# Patient Record
Sex: MALE | Employment: FULL TIME | URBAN - METROPOLITAN AREA
[De-identification: names, ages, dates, MRNs, and addresses within clinical notes are randomized per-mention and may not be internally consistent; named-entity substitution may affect disease eponyms.]

---

## 2021-12-26 ENCOUNTER — HOSPITAL ENCOUNTER (OUTPATIENT)
Age: 66
Setting detail: OBSERVATION
Discharge: HOME | End: 2021-12-28
Attending: STUDENT IN AN ORGANIZED HEALTH CARE EDUCATION/TRAINING PROGRAM | Admitting: HOSPITALIST
Payer: COMMERCIAL

## 2021-12-26 ENCOUNTER — APPOINTMENT (OUTPATIENT)
Dept: CT IMAGING | Age: 66
End: 2021-12-26
Attending: STUDENT IN AN ORGANIZED HEALTH CARE EDUCATION/TRAINING PROGRAM
Payer: COMMERCIAL

## 2021-12-26 DIAGNOSIS — E78.5 HYPERLIPIDEMIA, UNSPECIFIED HYPERLIPIDEMIA TYPE: ICD-10-CM

## 2021-12-26 DIAGNOSIS — E11.49 CONTROLLED TYPE 2 DIABETES MELLITUS WITH OTHER NEUROLOGIC COMPLICATION, WITHOUT LONG-TERM CURRENT USE OF INSULIN (HCC): ICD-10-CM

## 2021-12-26 DIAGNOSIS — G45.9 TIA (TRANSIENT ISCHEMIC ATTACK): Primary | ICD-10-CM

## 2021-12-26 LAB
ALBUMIN SERPL-MCNC: 4 G/DL (ref 3.5–5)
ALBUMIN/GLOB SERPL: 1.2 {RATIO} (ref 1.1–2.2)
ALP SERPL-CCNC: 62 U/L (ref 45–117)
ALT SERPL-CCNC: 42 U/L (ref 12–78)
ANION GAP SERPL CALC-SCNC: 12 MMOL/L (ref 5–15)
AST SERPL-CCNC: 21 U/L (ref 15–37)
BASOPHILS # BLD: 0 K/UL (ref 0–0.1)
BASOPHILS NFR BLD: 1 % (ref 0–1)
BILIRUB SERPL-MCNC: 0.3 MG/DL (ref 0.2–1)
BUN SERPL-MCNC: 26 MG/DL (ref 6–20)
BUN/CREAT SERPL: 29 (ref 12–20)
CALCIUM SERPL-MCNC: 9.1 MG/DL (ref 8.5–10.1)
CHLORIDE SERPL-SCNC: 102 MMOL/L (ref 97–108)
CO2 SERPL-SCNC: 28 MMOL/L (ref 21–32)
CREAT SERPL-MCNC: 0.9 MG/DL (ref 0.7–1.3)
DIFFERENTIAL METHOD BLD: NORMAL
EOSINOPHIL # BLD: 0 K/UL (ref 0–0.4)
EOSINOPHIL NFR BLD: 1 % (ref 0–7)
ERYTHROCYTE [DISTWIDTH] IN BLOOD BY AUTOMATED COUNT: 12.6 % (ref 11.5–14.5)
GLOBULIN SER CALC-MCNC: 3.3 G/DL (ref 2–4)
GLUCOSE BLD STRIP.AUTO-MCNC: 112 MG/DL (ref 65–117)
GLUCOSE SERPL-MCNC: 123 MG/DL (ref 65–100)
HCT VFR BLD AUTO: 43.1 % (ref 36.6–50.3)
HGB BLD-MCNC: 14.4 G/DL (ref 12.1–17)
IMM GRANULOCYTES # BLD AUTO: 0 K/UL (ref 0–0.04)
IMM GRANULOCYTES NFR BLD AUTO: 0 % (ref 0–0.5)
INR PPP: 1.1 (ref 0.9–1.1)
LYMPHOCYTES # BLD: 2.1 K/UL (ref 0.8–3.5)
LYMPHOCYTES NFR BLD: 32 % (ref 12–49)
MCH RBC QN AUTO: 30.6 PG (ref 26–34)
MCHC RBC AUTO-ENTMCNC: 33.4 G/DL (ref 30–36.5)
MCV RBC AUTO: 91.5 FL (ref 80–99)
MONOCYTES # BLD: 0.3 K/UL (ref 0–1)
MONOCYTES NFR BLD: 5 % (ref 5–13)
NEUTS SEG # BLD: 4.1 K/UL (ref 1.8–8)
NEUTS SEG NFR BLD: 61 % (ref 32–75)
NRBC # BLD: 0 K/UL (ref 0–0.01)
NRBC BLD-RTO: 0 PER 100 WBC
PLATELET # BLD AUTO: 250 K/UL (ref 150–400)
PMV BLD AUTO: 10 FL (ref 8.9–12.9)
POTASSIUM SERPL-SCNC: 3.8 MMOL/L (ref 3.5–5.1)
PROT SERPL-MCNC: 7.3 G/DL (ref 6.4–8.2)
PROTHROMBIN TIME: 11 SEC (ref 9–11.1)
RBC # BLD AUTO: 4.71 M/UL (ref 4.1–5.7)
SERVICE CMNT-IMP: NORMAL
SODIUM SERPL-SCNC: 142 MMOL/L (ref 136–145)
TSH SERPL DL<=0.05 MIU/L-ACNC: 1.32 UIU/ML (ref 0.36–3.74)
WBC # BLD AUTO: 6.7 K/UL (ref 4.1–11.1)

## 2021-12-26 PROCEDURE — 80061 LIPID PANEL: CPT

## 2021-12-26 PROCEDURE — 85610 PROTHROMBIN TIME: CPT

## 2021-12-26 PROCEDURE — 93005 ELECTROCARDIOGRAM TRACING: CPT

## 2021-12-26 PROCEDURE — 82962 GLUCOSE BLOOD TEST: CPT

## 2021-12-26 PROCEDURE — 74011250637 HC RX REV CODE- 250/637: Performed by: STUDENT IN AN ORGANIZED HEALTH CARE EDUCATION/TRAINING PROGRAM

## 2021-12-26 PROCEDURE — 74011000636 HC RX REV CODE- 636: Performed by: STUDENT IN AN ORGANIZED HEALTH CARE EDUCATION/TRAINING PROGRAM

## 2021-12-26 PROCEDURE — 70450 CT HEAD/BRAIN W/O DYE: CPT

## 2021-12-26 PROCEDURE — 85025 COMPLETE CBC W/AUTO DIFF WBC: CPT

## 2021-12-26 PROCEDURE — 84443 ASSAY THYROID STIM HORMONE: CPT

## 2021-12-26 PROCEDURE — G0378 HOSPITAL OBSERVATION PER HR: HCPCS

## 2021-12-26 PROCEDURE — 94762 N-INVAS EAR/PLS OXIMTRY CONT: CPT

## 2021-12-26 PROCEDURE — 99285 EMERGENCY DEPT VISIT HI MDM: CPT

## 2021-12-26 PROCEDURE — 70498 CT ANGIOGRAPHY NECK: CPT

## 2021-12-26 PROCEDURE — 36415 COLL VENOUS BLD VENIPUNCTURE: CPT

## 2021-12-26 PROCEDURE — 80053 COMPREHEN METABOLIC PANEL: CPT

## 2021-12-26 RX ORDER — METFORMIN HYDROCHLORIDE 500 MG/1
500 TABLET ORAL 2 TIMES DAILY WITH MEALS
COMMUNITY

## 2021-12-26 RX ORDER — GUAIFENESIN 100 MG/5ML
81 LIQUID (ML) ORAL
Status: COMPLETED | OUTPATIENT
Start: 2021-12-26 | End: 2021-12-26

## 2021-12-26 RX ORDER — CLOPIDOGREL BISULFATE 75 MG/1
300 TABLET ORAL
Status: COMPLETED | OUTPATIENT
Start: 2021-12-26 | End: 2021-12-26

## 2021-12-26 RX ADMIN — IOPAMIDOL 100 ML: 755 INJECTION, SOLUTION INTRAVENOUS at 17:00

## 2021-12-26 RX ADMIN — CLOPIDOGREL BISULFATE 300 MG: 75 TABLET ORAL at 19:28

## 2021-12-26 RX ADMIN — ASPIRIN 81 MG CHEWABLE TABLET 81 MG: 81 TABLET CHEWABLE at 19:28

## 2021-12-26 NOTE — ED PROVIDER NOTES
Patient is a 45-year-old male presented to the ED with acute onset perioral numbness, right facial numbness as well as right upper and lower extremity numbness. No focal deficits. No history of head trauma, no anticoagulation. Blood glucose 112. Tier 1 code stroke called    The history is provided by the patient and a relative. Numbness  This is a new problem. The current episode started less than 1 hour ago. The problem has not changed since onset. There was right facial, left upper extremity and right upper extremity focality noted. Pertinent negatives include no focal weakness, no loss of balance, no slurred speech, no speech difficulty, no memory loss, no movement disorder, no auditory change, no mental status change and no disorientation. There has been no fever. Pertinent negatives include no vomiting, no altered mental status, no confusion and no nausea. There were no medications administered prior to arrival. Associated medical issues do not include trauma, seizures, dementia or CVA. Past Medical History:   Diagnosis Date    Diabetes ([facility] [address]     diet controlled    Hyperlipidemia     Sciatica        No past surgical history on file. History reviewed. No pertinent family history.     Social History     Socioeconomic History    Marital status:      Spouse name: Not on file    Number of children: Not on file    Years of education: Not on file    Highest education level: Not on file   Occupational History    Not on file   Tobacco Use    Smoking status: Never Smoker    Smokeless tobacco: Never Used   Substance and Sexual Activity    Alcohol use: No    Drug use: No    Sexual activity: Not on file   Other Topics Concern    Not on file   Social History Narrative    Not on file     Social Determinants of Health     Financial Resource Strain:     Difficulty of Paying Living Expenses: Not on file   Food Insecurity:     Worried About Running Out of Food in the Last Year: Not on file  Ran Out of Food in the Last Year: Not on file   Transportation Needs:     Lack of Transportation (Medical): Not on file    Lack of Transportation (Non-Medical): Not on file   Physical Activity:     Days of Exercise per Week: Not on file    Minutes of Exercise per Session: Not on file   Stress:     Feeling of Stress : Not on file   Social Connections:     Frequency of Communication with Friends and Family: Not on file    Frequency of Social Gatherings with Friends and Family: Not on file    Attends Restorationism Services: Not on file    Active Member of [address] [place] [address] [employer] or Organizations: Not on file    Attends Club or Organization Meetings: Not on file    Marital Status: Not on file   Intimate Partner Violence:     Fear of Current or Ex-Partner: Not on file    Emotionally Abused: Not on file    Physically Abused: Not on file    Sexually Abused: Not on file   Housing Stability:     Unable to Pay for Housing in the Last Year: Not on file    Number of [place] in the Last Year: Not on file    Unstable Housing in the Last Year: Not on file         ALLERGIES: Patient has no known allergies. Review of Systems   Constitutional: Negative. HENT: Negative. Eyes: Negative. Respiratory: Negative. Cardiovascular: Negative. Gastrointestinal: Negative. Negative for nausea and vomiting. Endocrine: Negative. Genitourinary: Negative. Musculoskeletal: Negative. Skin: Negative. Allergic/Immunologic: Negative. Neurological: Positive for numbness. Negative for focal weakness, speech difficulty and loss of balance. Hematological: Negative. Psychiatric/Behavioral: Negative. Negative for confusion and memory loss. Vitals:    12/26/21 1943 12/26/21 1958 12/26/21 2013 12/26/21 2028   BP: (!) 168/90 (!) 167/86 (!) 169/89 (!) 160/87   Pulse: 68 (!) 59 71 69   Resp: 16 15 17 16   Temp:       SpO2: 98% 97% 97% 97%   Weight:                Physical Exam  Vitals and nursing note reviewed. Constitutional:       General: He is not in acute distress. Appearance: Normal appearance. HENT:      Head: Normocephalic and atraumatic. Right Ear: External ear normal.      Left Ear: External ear normal.      Nose: Nose normal.   Eyes:      Extraocular Movements: Extraocular movements intact. Conjunctiva/sclera: Conjunctivae normal.   Cardiovascular:      Rate and Rhythm: Normal rate. Pulses: Normal pulses. Radial pulses are 2+ on the right side and 2+ on the left side. Heart sounds: Normal heart sounds. Pulmonary:      Effort: Pulmonary effort is normal.      Breath sounds: Normal breath sounds. Chest:      Chest wall: No deformity or tenderness. Abdominal:      General: Abdomen is flat. There is no distension. Tenderness: There is no abdominal tenderness. Musculoskeletal:         General: No deformity or signs of injury. Normal range of motion. Cervical back: Normal range of motion and neck supple. No tenderness. Skin:     General: Skin is warm and dry. Capillary Refill: Capillary refill takes less than 2 seconds. Neurological:      General: No focal deficit present. Mental Status: He is alert and oriented to person, place, and time. Cranial Nerves: No cranial nerve deficit. Sensory: Sensory deficit present. Motor: No weakness. Coordination: Coordination normal.      Gait: Gait normal.   Psychiatric:         Attention and Perception: Attention normal.         Mood and Affect: Mood normal.         Behavior: Behavior normal.          [facility]  ED Course as of 12/26/21 3987   Sun Dec 26, 2021   1640 Patient presenting with right-sided facial numbness, right upper extremity and right lower extremity numbness. No focal weakness. Very difficult exam due to patient not speaking [place]. Son is helpful with interpretation. Acute onset, less than 60 minutes. No trauma. No known blood thinners.   Tier 1 code stroke called [AL]   7422 NIH of zero, TIA, symptoms resolved after CT  Admit for full stroke work up with MRI  Give 300mg of plavix , 81mg of ASA now    DAPT, After that 81mg, 75mg for 3 weeks. Then 81mg daily   [AL]   2030 EKG interpretation:   Rhythm: normal sinus rhythm; and regular . Rate (approx.): 77; Axis: normal; Intervals: normal ; ST/T wave: normal; EKG documented and interpreted by Pat Quesada. Travis Lobo MD, Emergency Medicine.     [AL]      ED Course User Index  [AL] Giana Garza MD     LABORATORY RESULTS:  Labs Reviewed   METABOLIC PANEL, COMPREHENSIVE - Abnormal; Notable for the following components:       Result Value    Glucose 123 (*)     BUN 26 (*)     BUN/Creatinine ratio 29 (*)     All other components within normal limits   CBC WITH AUTOMATED DIFF   PROTHROMBIN TIME + INR   TSH 3RD GENERATION   LIPID PANEL   SAMPLES BEING HELD   HEMOGLOBIN A1C WITH EAG   GLUCOSE, POC       IMAGING RESULTS:  CT CODE NEURO HEAD WO CONTRAST   Final Result   No acute abnormality. CTA CODE NEURO HEAD AND NECK W CONT             MEDICATIONS GIVEN:  Medications   iopamidoL (ISOVUE-370) 76 % injection 100 mL (100 mL IntraVENous Given 12/26/21 1700)   clopidogreL (PLAVIX) tablet 300 mg (300 mg Oral Given 12/26/21 1928)   aspirin chewable tablet 81 mg (81 mg Oral Given 12/26/21 1928)       Differential diagnosis: Numbness, TIA, stroke    ED physician interpretation of imaging: CT head without acute bleeds  ED physician interpretation of EKG: No STEMI. See my interpretation EKG and ED course above. ED physician interpretation of laboratory results: Patient's lab work for stroke work-up unremarkable. MDM: Patient is a 57-year-old male who had right lower extremity as well as right facial numbness concerning for TIA with tier 1 stroke: Teleneurology consulted symptoms resolved while in the CT scanner who may have had a TIA who requires hospital admission for TIA work-up for an MRI and stroke evaluation.     Patient vital signs are stable, patient afebrile. DISPOSITION: Admitted    Perfect Serve Consult for Admission  6:28 PM    ED Room Number: SER03/03  Patient Name and age:  Eugenia Manning 77 y.o.  male  Working Diagnosis:   1. TIA (transient ischemic attack)        COVID-19 Suspicion:  no  Sepsis present:  no  Reassessment needed: no  Code Status:  Full Code  Readmission: no  Isolation Requirements:  no  Recommended Level of Care:  med/surg  Department:  [facility] - [phone]    Other: Patient presented to the ED with right-sided numbness, tier 1 code stroke called, symptoms resolved consistent with TIA. Teleneuro recommends admit for MRI, echo and additional stroke work-up. Patient given Plavix and aspirin in the ED. Noe Poon.  Nghia Kapoor MD        Procedures

## 2021-12-26 NOTE — ED TRIAGE NOTES
Right sided numbness starting 1 hr PTA. Pt has good feeling bilaterally that is equal, and good strength. Stroke alert called. Pt taken to CT.

## 2021-12-27 ENCOUNTER — APPOINTMENT (OUTPATIENT)
Dept: GENERAL RADIOLOGY | Age: 66
End: 2021-12-27
Attending: STUDENT IN AN ORGANIZED HEALTH CARE EDUCATION/TRAINING PROGRAM
Payer: COMMERCIAL

## 2021-12-27 LAB
ATRIAL RATE: 77 BPM
CALCULATED P AXIS, ECG09: 56 DEGREES
CALCULATED R AXIS, ECG10: -24 DEGREES
CALCULATED T AXIS, ECG11: 44 DEGREES
CHOLEST SERPL-MCNC: 195 MG/DL
DIAGNOSIS, 93000: NORMAL
HDLC SERPL-MCNC: 58 MG/DL
HDLC SERPL: 3.4 {RATIO} (ref 0–5)
LDLC SERPL CALC-MCNC: 107.4 MG/DL (ref 0–100)
P-R INTERVAL, ECG05: 208 MS
Q-T INTERVAL, ECG07: 396 MS
QRS DURATION, ECG06: 100 MS
QTC CALCULATION (BEZET), ECG08: 448 MS
TRIGL SERPL-MCNC: 148 MG/DL (ref ?–150)
VENTRICULAR RATE, ECG03: 77 BPM
VLDLC SERPL CALC-MCNC: 29.6 MG/DL

## 2021-12-27 PROCEDURE — G0378 HOSPITAL OBSERVATION PER HR: HCPCS

## 2021-12-27 PROCEDURE — 71045 X-RAY EXAM CHEST 1 VIEW: CPT

## 2021-12-27 RX ORDER — ACETAMINOPHEN 650 MG/1
650 SUPPOSITORY RECTAL
Status: DISCONTINUED | OUTPATIENT
Start: 2021-12-27 | End: 2021-12-28 | Stop reason: HOSPADM

## 2021-12-27 RX ORDER — ACETAMINOPHEN 325 MG/1
650 TABLET ORAL
Status: DISCONTINUED | OUTPATIENT
Start: 2021-12-27 | End: 2021-12-28 | Stop reason: HOSPADM

## 2021-12-27 RX ORDER — SODIUM CHLORIDE 0.9 % (FLUSH) 0.9 %
5-40 SYRINGE (ML) INJECTION AS NEEDED
Status: DISCONTINUED | OUTPATIENT
Start: 2021-12-27 | End: 2021-12-28 | Stop reason: HOSPADM

## 2021-12-27 RX ORDER — ONDANSETRON 4 MG/1
4 TABLET, ORALLY DISINTEGRATING ORAL
Status: DISCONTINUED | OUTPATIENT
Start: 2021-12-27 | End: 2021-12-28 | Stop reason: HOSPADM

## 2021-12-27 RX ORDER — SODIUM CHLORIDE 0.9 % (FLUSH) 0.9 %
5-40 SYRINGE (ML) INJECTION EVERY 8 HOURS
Status: DISCONTINUED | OUTPATIENT
Start: 2021-12-27 | End: 2021-12-28 | Stop reason: HOSPADM

## 2021-12-27 RX ORDER — POLYETHYLENE GLYCOL 3350 17 G/17G
17 POWDER, FOR SOLUTION ORAL DAILY PRN
Status: DISCONTINUED | OUTPATIENT
Start: 2021-12-27 | End: 2021-12-28 | Stop reason: HOSPADM

## 2021-12-27 RX ORDER — ONDANSETRON 2 MG/ML
4 INJECTION INTRAMUSCULAR; INTRAVENOUS
Status: DISCONTINUED | OUTPATIENT
Start: 2021-12-27 | End: 2021-12-28 | Stop reason: HOSPADM

## 2021-12-27 RX ADMIN — Medication 5 ML: at 23:00

## 2021-12-28 ENCOUNTER — APPOINTMENT (OUTPATIENT)
Dept: NON INVASIVE DIAGNOSTICS | Age: 66
End: 2021-12-28
Attending: HOSPITALIST
Payer: COMMERCIAL

## 2021-12-28 ENCOUNTER — APPOINTMENT (OUTPATIENT)
Dept: MRI IMAGING | Age: 66
End: 2021-12-28
Attending: STUDENT IN AN ORGANIZED HEALTH CARE EDUCATION/TRAINING PROGRAM
Payer: COMMERCIAL

## 2021-12-28 VITALS
HEIGHT: 71 IN | RESPIRATION RATE: 15 BRPM | HEART RATE: 59 BPM | DIASTOLIC BLOOD PRESSURE: 82 MMHG | BODY MASS INDEX: 20.03 KG/M2 | TEMPERATURE: 98.6 F | WEIGHT: 143.08 LBS | SYSTOLIC BLOOD PRESSURE: 124 MMHG | OXYGEN SATURATION: 97 %

## 2021-12-28 LAB
AMPHET UR QL SCN: NEGATIVE
ANION GAP SERPL CALC-SCNC: 6 MMOL/L (ref 5–15)
BARBITURATES UR QL SCN: NEGATIVE
BENZODIAZ UR QL: NEGATIVE
BUN SERPL-MCNC: 19 MG/DL (ref 6–20)
BUN/CREAT SERPL: 26 (ref 12–20)
CALCIUM SERPL-MCNC: 8.7 MG/DL (ref 8.5–10.1)
CANNABINOIDS UR QL SCN: NEGATIVE
CHLORIDE SERPL-SCNC: 108 MMOL/L (ref 97–108)
CHOLEST SERPL-MCNC: 196 MG/DL
CO2 SERPL-SCNC: 26 MMOL/L (ref 21–32)
COCAINE UR QL SCN: NEGATIVE
COMMENT, HOLDF: NORMAL
CREAT SERPL-MCNC: 0.73 MG/DL (ref 0.7–1.3)
DRUG SCRN COMMENT,DRGCM: NORMAL
ECHO AV AREA PEAK VELOCITY: 4.3 CM2
ECHO AV AREA PEAK VELOCITY: 4.3 CM2
ECHO AV PEAK GRADIENT: 3 MMHG
ECHO AV PEAK VELOCITY: 0.8 M/S
ECHO AV VELOCITY RATIO: 1.13
ECHO LV E' LATERAL VELOCITY: 10 CM/S
ECHO LV E' SEPTAL VELOCITY: 8 CM/S
ECHO LV INTERNAL DIMENSION DIASTOLIC MMODE: 6.4 CM (ref 4.2–5.9)
ECHO LV INTERNAL DIMENSION SYSTOLIC MMODE: 4.4 CM
ECHO LV IVSD MMODE: 0.8 CM (ref 0.6–1)
ECHO LV IVSS MMODE: 1.2 CM
ECHO LV POSTERIOR WALL DIASTOLIC MMODE: 0.8 CM (ref 0.6–1)
ECHO LV POSTERIOR WALL SYSTOLIC MMODE: 1.2 CM
ECHO LVOT AREA: 3.8 CM2
ECHO LVOT DIAM: 2.2 CM
ECHO LVOT PEAK GRADIENT: 4 MMHG
ECHO LVOT PEAK VELOCITY: 0.9 M/S
ECHO MV A VELOCITY: 0.55 M/S
ECHO MV E VELOCITY: 0.57 M/S
ECHO MV E/A RATIO: 1.04
ECHO MV E/E' LATERAL: 5.7
ECHO MV E/E' RATIO (AVERAGED): 6.41
ECHO MV E/E' SEPTAL: 7.13
ECHO RV FREE WALL PEAK S': 14 CM/S
ECHO RV TAPSE: 3.1 CM (ref 1.5–2)
EST. AVERAGE GLUCOSE BLD GHB EST-MCNC: 134 MG/DL
GLUCOSE BLD STRIP.AUTO-MCNC: 131 MG/DL (ref 65–117)
GLUCOSE SERPL-MCNC: 93 MG/DL (ref 65–100)
HBA1C MFR BLD: 6.3 % (ref 4–5.6)
HDLC SERPL-MCNC: 56 MG/DL
HDLC SERPL: 3.5 {RATIO} (ref 0–5)
LDLC SERPL CALC-MCNC: 117 MG/DL (ref 0–100)
MAGNESIUM SERPL-MCNC: 2.2 MG/DL (ref 1.6–2.4)
METHADONE UR QL: NEGATIVE
OPIATES UR QL: NEGATIVE
PCP UR QL: NEGATIVE
PHOSPHATE SERPL-MCNC: 3.4 MG/DL (ref 2.6–4.7)
POTASSIUM SERPL-SCNC: 3.5 MMOL/L (ref 3.5–5.1)
SAMPLES BEING HELD,HOLD: NORMAL
SERVICE CMNT-IMP: ABNORMAL
SODIUM SERPL-SCNC: 140 MMOL/L (ref 136–145)
TRIGL SERPL-MCNC: 115 MG/DL (ref ?–150)
UR CULT HOLD, URHOLD: NORMAL
VLDLC SERPL CALC-MCNC: 23 MG/DL

## 2021-12-28 PROCEDURE — 82962 GLUCOSE BLOOD TEST: CPT

## 2021-12-28 PROCEDURE — 74011250637 HC RX REV CODE- 250/637: Performed by: HOSPITALIST

## 2021-12-28 PROCEDURE — 80061 LIPID PANEL: CPT

## 2021-12-28 PROCEDURE — 97165 OT EVAL LOW COMPLEX 30 MIN: CPT

## 2021-12-28 PROCEDURE — 84100 ASSAY OF PHOSPHORUS: CPT

## 2021-12-28 PROCEDURE — 36415 COLL VENOUS BLD VENIPUNCTURE: CPT

## 2021-12-28 PROCEDURE — G0378 HOSPITAL OBSERVATION PER HR: HCPCS

## 2021-12-28 PROCEDURE — 80307 DRUG TEST PRSMV CHEM ANLYZR: CPT

## 2021-12-28 PROCEDURE — 70551 MRI BRAIN STEM W/O DYE: CPT

## 2021-12-28 PROCEDURE — 93306 TTE W/DOPPLER COMPLETE: CPT

## 2021-12-28 PROCEDURE — 83036 HEMOGLOBIN GLYCOSYLATED A1C: CPT

## 2021-12-28 PROCEDURE — 80048 BASIC METABOLIC PNL TOTAL CA: CPT

## 2021-12-28 PROCEDURE — 97161 PT EVAL LOW COMPLEX 20 MIN: CPT

## 2021-12-28 PROCEDURE — 99205 OFFICE O/P NEW HI 60 MIN: CPT | Performed by: PSYCHIATRY & NEUROLOGY

## 2021-12-28 PROCEDURE — 83735 ASSAY OF MAGNESIUM: CPT

## 2021-12-28 RX ORDER — ATORVASTATIN CALCIUM 20 MG/1
20 TABLET, FILM COATED ORAL DAILY
Status: DISCONTINUED | OUTPATIENT
Start: 2021-12-28 | End: 2021-12-28 | Stop reason: HOSPADM

## 2021-12-28 RX ORDER — DEXTROSE 50 % IN WATER (D50W) INTRAVENOUS SYRINGE
12.5-25 AS NEEDED
Status: DISCONTINUED | OUTPATIENT
Start: 2021-12-28 | End: 2021-12-28 | Stop reason: HOSPADM

## 2021-12-28 RX ORDER — LORAZEPAM 2 MG/ML
1 INJECTION INTRAMUSCULAR ONCE
Status: DISCONTINUED | OUTPATIENT
Start: 2021-12-28 | End: 2021-12-28 | Stop reason: HOSPADM

## 2021-12-28 RX ORDER — INSULIN LISPRO 100 [IU]/ML
INJECTION, SOLUTION INTRAVENOUS; SUBCUTANEOUS
Status: DISCONTINUED | OUTPATIENT
Start: 2021-12-28 | End: 2021-12-28 | Stop reason: HOSPADM

## 2021-12-28 RX ORDER — ASPIRIN 81 MG/1
81 TABLET ORAL DAILY
Status: DISCONTINUED | OUTPATIENT
Start: 2021-12-28 | End: 2021-12-28 | Stop reason: HOSPADM

## 2021-12-28 RX ORDER — MAGNESIUM SULFATE 100 %
4 CRYSTALS MISCELLANEOUS AS NEEDED
Status: DISCONTINUED | OUTPATIENT
Start: 2021-12-28 | End: 2021-12-28 | Stop reason: HOSPADM

## 2021-12-28 RX ORDER — ASPIRIN 81 MG/1
81 TABLET ORAL DAILY
Qty: 30 TABLET | Refills: 11 | Status: SHIPPED | OUTPATIENT
Start: 2021-12-29

## 2021-12-28 RX ORDER — ATORVASTATIN CALCIUM 20 MG/1
20 TABLET, FILM COATED ORAL DAILY
Qty: 30 TABLET | Refills: 1 | Status: SHIPPED | OUTPATIENT
Start: 2021-12-29

## 2021-12-28 RX ADMIN — Medication 5 ML: at 06:16

## 2021-12-28 RX ADMIN — ATORVASTATIN CALCIUM 20 MG: 20 TABLET, FILM COATED ORAL at 08:50

## 2021-12-28 RX ADMIN — Medication 10 ML: at 14:20

## 2021-12-28 RX ADMIN — ASPIRIN 81 MG: 81 TABLET, COATED ORAL at 08:50

## 2021-12-28 NOTE — PROGRESS NOTES
Hospital follow-up PCP transitional care appointment has been scheduled with Dr. Chase Cruz for Tuesday, 1/11/22 at 10:30 a.m. Pending patient discharge.   Benny Khan, Care Management Specialist.

## 2021-12-28 NOTE — PROGRESS NOTES
OCCUPATIONAL THERAPY EVALUATION/DISCHARGE  Patient: Melani Meza (68 y.o. male)  Date: 12/28/2021  Primary Diagnosis: TIA (transient ischemic attack) [G45.9]       Precautions:  (Bosnian speaking)    ASSESSMENT  Note patient admitted for CVA work-up due to R sided numbness per chart, which patient reports has resolved. He presents with no other neurological deficits and is at independent functional baseline. Visual functions, sensation, perception, and BUE motor functions intact. Performed sit-stand and ambulated within room with intact balance. Patient and son receptive to education on BEFAST education on signs/ symptoms of CVA. Patient does not require additional OT at this time. Recommend return home at d/c. Current Level of Function (ADLs/self-care): Independent     Functional Outcome Measure: The patient scored Total A-D  Total A-D (Motor Function): 66/66 on the Fugl-Mcdonald Assessment with each UE which is indicative of no impairment in upper extremity functional status. PLAN :  Recommendation for discharge: (in order for the patient to meet his/her long term goals)  No skilled occupational therapy/ follow up rehabilitation needs identified at this time. This discharge recommendation:  Has been made in collaboration with the attending provider and/or case management         SUBJECTIVE:   Patient stated I'm better now.  (per son's translation)    OBJECTIVE DATA SUMMARY:   HISTORY:   Past Medical History:   Diagnosis Date    Diabetes ([address]     diet controlled    Hyperlipidemia     Sciatica    No past surgical history on file.     Prior Level of Function/Environment/Context: independent, lives with wife  Expanded or extensive additional review of patient history:     Home Situation  Home Environment: Private residence  Living Alone: No  Support Systems: Child(nuria)  Patient Expects to be Discharged to[de-identified] House  Current DME Used/Available at Home: None    EXAMINATION OF PERFORMANCE DEFICITS:  Cognitive/Behavioral Status:  Neurologic State: Alert  Orientation Level: Oriented X4  Cognition: Follows commands  Perception: Appears intact          Skin: visible skin appears intact    Edema: none noted    Hearing: WDL    Vision/Perceptual:    Tracking: Able to track stimulus in all quadrants w/o difficulty              Visual Fields:  (intact)  Diplopia: No    Acuity: Within Defined Limits         Range of Motion:    AROM: Within functional limits  PROM: Within functional limits                      Strength:    Strength: Within functional limits                Coordination:  Coordination: Within functional limits  Fine Motor Skills-Upper: Left Intact; Right Intact    Gross Motor Skills-Upper: Left Intact; Right Intact    Tone & Sensation:    Tone: Normal  Sensation: Intact                      Balance:  Sitting: Intact  Standing: Intact    Functional Mobility and Transfers for ADLs:  Bed Mobility:       Transfers:  Sit to Stand: Independent  Stand to Sit: Independent    ADL Assessment:  Feeding: Independent    Oral Facial Hygiene/Grooming: Independent    Bathing: Independent    Upper Body Dressing: Independent    Lower Body Dressing: Independent    Toileting: Independent            Functional Measure:  Fugl-Mcdonald Assessment of Motor Recovery after Stroke:   Reflex Activity  Flexors/Biceps/Fingers: Can be elicited  Extensors/Triceps: Can be elicited  Reflex Subtotal: 4    Volitional Movement Within Synergies  Shoulder Retraction: Full  Shoulder Elevation: Full  Shoulder Abduction (90 degrees): Full  Shoulder External Rotation: Full  Elbow Flexion: Full  Forearm Supination: Full  Shoulder Adduction/Internal Rotation: Full  Elbow Extension: Full  Forearm Pronation: Full  Subtotal: 18    Volitional Movement Mixing Synergies  Hand to Lumbar Spine: Full  Shoulder Flexion (0-90 degrees): Full  Pronation-Supination: Full  Subtotal: 6    Volitional Movement With Little or No Synergy  Shoulder Abduction (0-90 degrees): Full  Shoulder Flexion ( degrees): Full  Pronation/Supination: Full  Subtotal : 6    Normal Reflex Activity  Biceps, Triceps, Finger Flexors: Full  Subtotal : 2    Upper Extremity Total   Upper Extremity Total: 36    Wrist  Stability at 15 Degree Dorsiflexion: Full  Repeated Dorsiflexion/ Volar Flexion: Full  Stability at 15 Degree Dorsiflexion: Full  Repeated Dorsiflexion/ Volar Flexion: Full  Circumduction: Full  Wrist Total: 10    Hand  Mass Flexion: Full  Mass Extension: Full  Grasp A: Full  Grasp B: Full  Grasp C: Full  Grasp D: Full  Grasp E: Full  Hand Total: 14    Coordination/Speed  Tremor: None  Dysmetria: None  Time: <1s  Coordination/Speed Total : 6    Total A-D  Total A-D (Motor Function): 66/66     This is a reliable/valid measure of arm function after a neurological event. It has established value to characterize functional status and for measuring spontaneous and therapy-induced recovery; tests proximal and distal motor functions. Fugl-Mcdonald Assessment  UE scores recorded between five and 30 days post neurologic event can be used to predict UE recovery at six months post neurologic event. Severe = 0-21 points   Moderately Severe = 22-33 points   Moderate = 34-47 points   Mild = 48-66 points  HERIBERTO Lindsey, CHRIS Perkins, & SHARMILA Mark (1992). Measurement of motor recovery after stroke: Outcome assessment and sample size requirements.  Stroke, 23, pp. 8784-6496.   ------------------------------------------------------------------------------------------------------------------------------------------------------------------  MCID:  Stroke:   Michael Sheth, 2001; n = 171; mean age 79 (6) years; assessed within 16 (12) days of stroke, Acute Stroke)  FMA Motor Scores from Admission to Discharge   10 point increase in FMA Upper Extremity = 1.5 change in discharge FIM   10 point increase in FMA Lower Extremity = 1.9 change in discharge FIM  MDC:   Stroke:   Aleyda Riojas et al, 2008, n = 14, mean age = 59.9 (14.6) years, assessed on average 14 (6.5) months post stroke, Chronic Stroke)   FMA = 5.2 points for the Upper Extremity portion of the assessment     Occupational Therapy Evaluation Charge Determination   History Examination Decision-Making   LOW Complexity : Brief history review  LOW Complexity : 1-3 performance deficits relating to physical, cognitive , or psychosocial skils that result in activity limitations and / or participation restrictions  LOW Complexity : No comorbidities that affect functional and no verbal or physical assistance needed to complete eval tasks       Based on the above components, the patient evaluation is determined to be of the following complexity level: LOW   Pain Rating:  Patient did not report pain    Activity Tolerance:   VSS, good    After treatment patient left in no apparent distress:    Supine on stretcher for transport off the floor    COMMUNICATION/EDUCATION:   The patients plan of care was discussed with: Physical therapist, Registered nurse and Case management. Patient and/or family was verbally educated on the BE FAST acronym for signs/symptoms of CVA and TIA. BE FAST was written on patient's communication board  for visual education and reinforcement. All questions answered with patient indicating good understanding.      Thank you for this referral.  Delroy Mendoza OT  Time Calculation: 9 mins

## 2021-12-28 NOTE — H&P
History & Physical    Primary Care Provider: None  Source of Information: Patient and chart review    History of Presenting Illness:   Alicja Varner is a 77 y.o. male with pmh of niddm ii, dyslipidemia who presented to ed with complaints of right facial, RUE and RLE numbness. Patient reports sudden onset of symptoms that prompted him to seek ER care. He denies any previous history of strokes. Denies any history of head injuries. At time of my consultation, he states his symptoms have resolved and he is at his baseline. He has had no headaches, vision changes, ongoing numbness, weakness or gait changes. He was evaluated in ER and neurology recommended admission for CVA work-up. The patient denies any fever, chills, chest or abdominal pain, nausea, vomiting, cough, congestion, recent illness, palpitations, or dysuria. Remarkable vitals on ER Presentations: unremarkbale  Labs Remarkable for: unremarkable  ER Images: ct head and cta hea and neck showed no acute process. Review of Systems:  A comprehensive review of systems was negative except for that written in the History of Present Illness. Past Medical History:   Diagnosis Date    Diabetes ([address]     diet controlled    Hyperlipidemia     Sciatica       No past surgical history on file. Prior to Admission medications    Medication Sig Start Date End Date Taking? Authorizing Provider   metFORMIN (GLUCOPHAGE) 500 mg tablet Take 500 mg by mouth two (2) times daily (with meals). Yes Other, MD Martha     No Known Allergies   History reviewed. No pertinent family history.      SOCIAL HISTORY:  Patient resides:  Independently x   Assisted Living    SNF    With family care       Smoking history:   None x   Former    Chronic      Alcohol history:   None x   Social    Chronic      Ambulates:   Independently x   w/cane    w/walker    w/wc    CODE STATUS:  DNR    Full x   Other      Objective:     Physical Exam:     Visit Vitals  /65   Pulse 61   Temp 98.2 °F (36.8 °C)   Resp 15   Ht 5' 11\" (1.803 m)   Wt 65.9 kg (145 lb 4.5 oz)   SpO2 93%   BMI 20.26 kg/m²      O2 Device: None (Room air)    General:  Alert, cooperative, no distress, appears stated age. Head:  Normocephalic, without obvious abnormality, atraumatic. Eyes:  Conjunctivae/corneas clear. PERRL, EOMs intact. Nose: Nares normal. Septum midline. Mucosa normal.        Neck: Supple, symmetrical, trachea midline, no carotid bruit and no JVD. Lungs:   Clear to auscultation bilaterally. Chest wall:  No tenderness or deformity. Heart:  Regular rate and rhythm, S1, S2 normal, no murmur, click, rub or gallop. Abdomen:   Soft, non-tender. Bowel sounds normal. No masses,  No organomegaly. Extremities: Extremities normal, atraumatic, no cyanosis or edema. Pulses: 2+ and symmetric all extremities. Skin: Skin color, texture, turgor normal. No rashes or lesions   Neurologic: CNII-XII intact. EKG:  normal EKG, normal sinus rhythm. Data Review:     Recent Days:  Recent Labs     12/26/21  1643   WBC 6.7   HGB 14.4   HCT 43.1        Recent Labs     12/26/21  1643      K 3.8      CO2 28   *   BUN 26*   CREA 0.90   CA 9.1   ALB 4.0   ALT 42   INR 1.1     No results for input(s): PH, PCO2, PO2, HCO3, FIO2 in the last 72 hours. 24 Hour Results:  No results found for this or any previous visit (from the past 24 hour(s)).       Imaging:     Assessment:     Matthew Lewis is a 77 y.o. male with pmh of niddm ii, dyslipidemia who is admitted for TIA/CVA r/o       Plan:       TIA/CVA r/o  -Presenting with right facial and right sided numbness  -CT Head CTA H&N unremarkable  -Tele neuro recommended adt for CVA  -Symptoms since resolved  -TSH unremarkable  -MRI Brain in AM  -Check mag, phos, etoh and uds  -PT/OT  -Additional w/u per neuro    NIDDM II  -Check a1c  -SSI + hypoglycemic protocols    Dyslipidemia  -Not on a statin or asa  -initiate per neuro          FEN/GI -  PO hydration  Activity - as tolerated  DVT prophylaxis - scd  GI prophylaxis -  NI  Disposition - Home    CODE STATUS:  Full code       Signed By: Brendan Blanco MD     December 27, 2021

## 2021-12-28 NOTE — PROGRESS NOTES
Problem: Falls - Risk of  Goal: *Absence of Falls  Description: Document Gabriella Pearce Fall Risk and appropriate interventions in the flowsheet.   Outcome: Progressing Towards Goal  Note: Fall Risk Interventions:  Mobility Interventions: Communicate number of staff needed for ambulation/transfer,Patient to call before getting OOB                             Problem: Patient Education: Go to Patient Education Activity  Goal: Patient/Family Education  Outcome: Progressing Towards Goal

## 2021-12-28 NOTE — PROGRESS NOTES
Per Son, Patient had labs done 7/8/21  Obtained from lab papers picture on phone    A1C 7.9  Cholesterol total 228  HDL 62

## 2021-12-28 NOTE — CONSULTS
Neuro consult completed, dictated note to follow. TIA. Presented with right F/A/L numbness and mild weakness that lasted 40 minutes. No recurrent sxs. Exam is non-focal and unremarkable. No HTN. DM is well controlled with HgbA1C 6.3. , goal< 70, continue Lipitor 20 mg as started. Continue ASA 81mg daily as started.  F/u with PCP, does not need neurology f/u (next appt is in June regardless.)

## 2021-12-28 NOTE — PROGRESS NOTES
Bedside and Verbal shift change report given to 1710 Alex Davis (oncoming nurse) by Aaron Gillespie (offgoing nurse). Report included the following information Kardex, MAR, Recent Results and Cardiac Rhythm sINUS YENNI-sinus rhythm.

## 2021-12-28 NOTE — PROGRESS NOTES
PHYSICAL THERAPY EVALUATION WITH DISCHARGE  Patient: Hal Tobias (68 y.o. male)  Date: 12/28/2021   Primary Diagnosis: TIA (transient ischemic attack) [G45.9]       Precautions:    (Bosnian speaking)      ASSESSMENT  Based on the objective data described below, the patient presents with reports of R sided numbness that has now resolved s/p admission on 12/26 for CVA/TIA work up. Pt received seated EOB with son at bedside to assist with translation. Transport present to take patient for MRI. Head CT: negative. Pt demonstrated 5/5 LE strength and intact sensation. Pt completed transfers independently without AD and gait training with supervision/independent level. Pt with no apparent gait abnormalities. No further acute PT needs recommended or follow up post discharge. .    Functional Outcome Measure: The patient scored Total: 53/56 on the Butler Balance Assessment which is indicative of low fall risk. Other factors to consider for discharge: none     Further skilled acute physical therapy is not indicated at this time. PLAN :  Recommendation for discharge: (in order for the patient to meet his/her long term goals)  No skilled physical therapy/ follow up rehabilitation needs identified at this time. This discharge recommendation:  Has been made in collaboration with the attending provider and/or case management    IF patient discharges home will need the following DME: none         SUBJECTIVE:   Patient stated Kenia Flood I don't speak English well.     OBJECTIVE DATA SUMMARY:   HISTORY:    Past Medical History:   Diagnosis Date    Diabetes ([address]     diet controlled    Hyperlipidemia     Sciatica    No past surgical history on file.     Prior level of function: independent, lives with spouse, 2 daughters and son live in the area to assist as needed  Personal factors and/or comorbidities impacting plan of care:     Home Situation  Home Environment: Private residence  Living Alone: No  Support Systems: Spouse/Significant Other,Child(nuria)  Patient Expects to be Discharged to[de-identified] House  Current DME Used/Available at Home: None    EXAMINATION/PRESENTATION/DECISION MAKING:   Critical Behavior:  Neurologic State: Alert  Orientation Level: Oriented X4  Cognition: Follows commands,Appropriate decision making,Appropriate for age attention/concentration     Hearing:     Skin:    Edema:   Range Of Motion:  AROM: Within functional limits           PROM: Within functional limits           Strength:    Strength:  Within functional limits                    Tone & Sensation:   Tone: Normal              Sensation: Intact               Coordination:  Coordination: Within functional limits  Vision:      Functional Mobility:  Bed Mobility:              Transfers:  Sit to Stand: Independent  Stand to Sit: Independent                       Balance:   Sitting: Intact  Standing: Intact  Ambulation/Gait Training:  Distance (ft): 15 Feet (ft)  Assistive Device: Gait belt  Ambulation - Level of Assistance: Independent;Supervision        Gait Abnormalities: Decreased step clearance        Base of Support: Narrowed         Functional Measure  Butler Balance Test:    Sitting to Standin  Standing Unsupported: 4  Sitting with Back Unsupported: 4  Standing to Sittin  Transfers: 4  Standing Unsupported with Eyes Closed: 4  Standing Unsupported with Feet Together: 4  Reach Forward with Outstretched Arm: 4   Object: 4  Turn to Look Over Shoulders: 4  Turn 360 Degrees: 4  Alternate Foot on Step/Stool: 3  Standing Unsupported One Foot in Front: 3  Stand on One Leg: 3  Total: 53/56         56=Maximum possible score;   0-20=High fall risk  21-40=Moderate fall risk   41-56=Low fall risk        Based on the above components, the patient evaluation is determined to be of the following complexity level: LOW     Pain Rating:  Pt denied pain    Activity Tolerance:   Good    After treatment patient left in no apparent distress:   supine on ciara    COMMUNICATION/EDUCATION:   The patients plan of care was discussed with: Occupational therapist and Registered nurse. Patient was educated regarding His deficit(s) of impaired sensation R side (resolved at time of eval) as this relates to His diagnosis of TIA/CVA. He demonstrated Good understanding. Patient and/or family was verbally educated on the BE FAST acronym for signs/symptoms of CVA and TIA. BE FAST was written on patient's communication board  for visual education and reinforcement. All questions answered with patient indicating good understanding. Fall prevention education was provided and the patient/caregiver indicated understanding., Patient/family have participated as able in goal setting and plan of care. and Patient/family agree to work toward stated goals and plan of care.     Thank you for this referral.  Michael August, PT, DPT   Time Calculation: 8 mins

## 2021-12-28 NOTE — DISCHARGE INSTRUCTIONS
Please bring this form with you to show your primary care provider at your follow-up appointment. Primary care provider:  Dr. Naomi Crocker MD    Discharging provider:  Libby Zamora MD    You have been admitted to the hospital with the following diagnoses:  · TIA (transient ischemic attack) [G45.9]    FOLLOW-UP CARE RECOMMENDATIONS:    APPOINTMENTS:  Follow-up Information     Follow up With Specialties Details Why Contact Info    Naomi Crocker MD Family Medicine  Discharge follow up  [address] [facility]  [phone]              FOLLOW-UP TESTS recommended:   - repeat A1c in 3 months  - discuss increasing Metformin with PCP     PENDING TEST RESULTS:  At the time of your discharge the following test results are still pending: none  Please make sure you review these results with your outpatient follow-up provider(s). SYMPTOMS to watch for: chest pain, shortness of breath, fever, chills, nausea, vomiting, diarrhea, change in mentation, falling, weakness, bleeding. DIET/what to eat:  Diabetic Diet    ACTIVITY:  Activity as tolerated    WOUND CARE: NONE    EQUIPMENT needed:  NONE      What to do if new or unexpected symptoms occur? If you experience any of the above symptoms (or should other concerns or questions arise after discharge) please call your primary care physician. Return to the emergency room if you cannot get hold of your doctor. · It is very important that you keep your follow-up appointment(s). · Please bring discharge papers, medication list (and/or medication bottles) to your follow-up appointments for review by your outpatient provider(s). · Please check the list of medications and be sure it includes every medication (even non-prescription medications) that your provider wants you to take. · It is important that you take the medication exactly as they are prescribed.    · Keep your medication in the bottles provided by the pharmacist and keep a list of the medication names, dosages, and times to be taken in your wallet. · Do not take other medications without consulting your doctor. · If you have any questions about your medications or other instructions, please talk to your nurse or care provider before you leave the hospital.    I understand that if any problems occur once I am at home I am to contact my physician. These instructions were explained to me and I had the opportunity to ask questions.

## 2021-12-28 NOTE — PROGRESS NOTES
TRANSITION OF CARE  RUR--N/A  Disposition--Return home to independent functioning. PT evaluation of 12/28/21: no skilled needs. Nai Rodriguez    Patient's primary family contact: son Maria Esther Donnelly    Note: patient does not speak [place]. Patient speaks [place]. Note: Per son, Patient has been approved for Medicare Part A (has chosen not be enrolled in Medicare B at this time). Approval was recent and start date was backdated to April 2021 with card soon to arrive via mail. CM gave patients son the Patient Guide to Observation and Outpatient Care which patient's son signed--original to patient's son and copy to chart. CM gave patient's son the Medicare Outpatient Observation Notice which patient's son signed-- original to patient's son and copy to chart. Care Management Interventions  PCP Verified by CM: Yes  Transition of Care Consult (CM Consult): Other (None)  Physical Therapy Consult: Yes  Occupational Therapy Consult: Yes  Speech Therapy Consult: No  Support Systems: Child(nuria)  Confirm Follow Up Transport: Family  The Plan for Transition of Care is Related to the Following Treatment Goals : Return to independent functioning. Discharge Location  Discharge Placement: Home    Reason for Admission:  R/O CVA, TIA                   RUR Score:       6%                Plan for utilizing home health:      None    PCP: First and Last name:  Ailyn Gallardo MD   Name of Practice: [facility]   Are you a current patient: Yes    Approximate date of last visit:    Can you participate in a virtual visit with your PCP:                     Current Advanced Directive/Advance Care Plan: Full Code    Healthcare Decision Maker:   Click here to complete [address] including selection of the Healthcare Decision Maker Relationship (ie \"Primary\")                           Transition of Care Plan:                    CM met with patient and his son Padmini Bond.  Patient lives with his wife with 4 steps in and 7 interior steps. Patient has 2 supportive sisters. Patient is ambulatory and expects return to independent functioning and employment. Patient's son confirmed PCP, health insurance, and prescription coverage.    Previous home health :None  Previous IPR/ SNF rehab : None  DME : None

## 2021-12-28 NOTE — ED NOTES
TRANSFER - OUT REPORT:    Verbal report given to Angle Moura RN on TeleFlip  being transferred to [facility]ST[facility] room 672 for routine progression of care       Report consisted of patients Situation, Background, Assessment and   Recommendations(SBAR). Information from the following report(s) SBAR, ED Summary, [facility] and Cardiac Rhythm NSR was reviewed with the receiving nurse. Lines:   Peripheral IV 12/26/21 Anterior;Left;Proximal Forearm (Active)        Opportunity for questions and clarification was provided.      Patient is [place] speaking; limited/no english      Patient transported with:  [employer]    Monitor

## 2021-12-28 NOTE — PROGRESS NOTES
[address]           12/28/2021      RE: Maritza Perkins        To Whom It May Concern,    This is to certify that Wilda Davis has been under the care of [facility] due to acute illness from 12/26/2021 until 12/28/2021. Please excuse him from work until 1/2/2022. Yadira Johnson may return to work on/after 1/3/2022. Please feel free to contact my office ([phone]) if you have any questions or concerns. Thank you for your assistance in this matter.     Sincerely,            Jayden Banegas M.D  Internal Medicine

## 2021-12-28 NOTE — PROGRESS NOTES
I have reviewed discharge instructions with the caregiver. The caregiver verbalized understanding. Bedside RN performed patient education and medication education. Discharge concerns initiated and discussed with patient, including clarification on \"who\" assists the patient at their home and instructions for when the home going patient should call their provider after discharge. Opportunity for questions and clarification was provided. Patient receptive to education: YES  Patient stated: understanding with translation from [employer]  Barriers to Education: speaks bosnian  Diagnosis Education given:  YES    Length of stay: 0  Expected Day of Discharge: 12/28/21  Ask if they have \"Help at Home\" & add to white board?   YES    Education Day #: 2    Medication Education Given:  YES  M in the box Medication name: lipitor    Pt aware of HCAHPS survey: YES          Stroke Education documented in Patient Education: YES  Core Measures Documented in Connect Care:  Risk Factors: YES  Warning signs of stroke: YES  When to Activate 911: YES  Medication Education for Risk Factors: YES  Smoking cessation if applicable: YES  Written Education Given:  YES    Discharge NIH Completed: YES  Score: 0    BRAINS: YES    Follow Up Appointment Made: YES  Date/Time if applicable: n/a

## 2021-12-28 NOTE — PROGRESS NOTES
Problem: TIA/CVA Stroke: 0-24 hours  Goal: Activity/Safety  Outcome: Progressing Towards Goal  Goal: Consults, if ordered  Outcome: Progressing Towards Goal  Goal: Nutrition/Diet  Outcome: Progressing Towards Goal  Goal: Medications  Outcome: Progressing Towards Goal

## 2021-12-29 NOTE — CONSULTS
[address]    Name:  Edgar Hahn  MR#:  [id number]  :  1955  ACCOUNT #:  [de-identified]  DATE OF SERVICE:  2021      NEUROLOGY CONSULTATION    HISTORY OF PRESENT ILLNESS:  This is a 51-year-old right-handed male, admitted 2021 with complaints of right face, arm and leg numbness and weakness that lasted for about 40 minutes. The patient is [facility] and is seen with his son at the bedside, who helps provide history and translation. The patient initially reported just numbness. On further questioning, states he noticed his right arm was a little weaker than the left. He denies prior history of TIA or stroke. He does have a diagnosis of diabetes for which he is on metformin, HgbA1c is 6.3. He has a history of hyperlipidemia, LDL is 117 and he is not on a statin at home. He was not on any antiplatelet therapy. He does not smoke. No known hypertension, blood pressure at presentation, however, was 168/90. Subsequent pressures have been in the 120s-135. The patient has been started on aspirin 81 mg a day and Lipitor 20 mg a day. CT of the head was unremarkable. CTA of the head and neck revealed atherosclerotic changes with moderate left and mild right vertebral origin stenosis, but no flow-limiting stenoses and no LVO. MRI of the brain is reviewed and is unremarkable with mild chronic ischemic changes seen only. His echo shows an EF of 60%-65%. No PFO. EKG with normal sinus rhythm. The patient has been stable since admission and is anxiously awaiting discharge. PAST MEDICAL HISTORY:  Diabetes, hyperlipidemia, sciatica. REVIEW OF SYSTEMS:  As per past medical history or HPI, otherwise reviewed and negative. MEDICATIONS AT HOME:  Metformin. Here, aspirin 81 mg a day and Lipitor 20 mg a day. ALLERGIES:  NO KNOWN DRUG ALLERGIES. SOCIAL HISTORY:  He is , lives in [facility] [place] [facility] with his wife. He is [place]. He is a shaffer.   No tobacco, alcohol or drug use. FAMILY HISTORY:  No strokes in the family. He has one son and two daughters who are healthy. PHYSICAL EXAMINATION:  VITAL SIGNS:  Blood pressure 126/69, pulse 73, respiratory rate 17, satting 96% on room air, temperature is 98.1, BMI of 19.9. GENERAL:  He is a well-nourished, well-developed, very healthy-appearing male, sitting in bed, in no distress. HEART:  Regular rate and rhythm without murmurs, gallops, or rubs. Carotids 2+, no bruits. EXTREMITIES:  Warm without clubbing, cyanosis or edema. NEUROLOGIC:  Mental Status:  Alert and oriented x4. Speech and language intact. Attention, memory, fund of knowledge appropriate. Cranial nerve exam, no facial asymmetry or ptosis. Extraocular eye movements intact without diplopia or nystagmus. Visual fields full. Pupils are round and reactive. Tongue midline. Palate elevated symmetrically. Strength, sensation, hearing intact. Trapezius and sternocleidomastoid are 5/5. Motor exam is 5/5 throughout. No pronator drift. He has a slight tremor in the left. Sensory exam is intact to light touch and pinprick throughout. Coordination:  Intact to finger-to-nose, rapid alternating movements. Heel-to-shin and gait not assessed at this time. STUDIES AND REPORTS:  Reviewed above. ASSESSMENT AND PLAN:  This is a 70-year right-handed male with well-controlled diabetes, hyperlipidemia with an LDL of 117, presenting with transient right face, arm and leg numbness and mild right upper extremity weakness, lasting a total of 40 minutes with mild atherosclerotic changes seen on CTA, but no flow-limiting stenosis or LVO. MRI of the brain negative for acute stroke. Echo is unremarkable. EKG with normal sinus rhythm. Exam is nonfocal.  This is consistent with a transient ischemic attack. Recommend starting Lipitor 20 mg a day at home with goal LDL of less than 70. Continue the aspirin 81 mg a day at home as started here.   The patient should follow up with his primary care physician for ongoing management of diabetes and hyperlipidemia. The patient does not need follow up in Neurology at this time.       Pa Walker MD      [id number]  D:  12/29/2021 10:33  T:  12/29/2021 11:02  JOB #:  [id number]

## 2022-03-19 PROBLEM — G45.9 TIA (TRANSIENT ISCHEMIC ATTACK): Status: ACTIVE | Noted: 2021-12-26

## 2023-04-28 ENCOUNTER — APPOINTMENT (OUTPATIENT)
Dept: GENERAL RADIOLOGY | Age: 68
End: 2023-04-28
Attending: FAMILY MEDICINE

## 2023-04-28 ENCOUNTER — HOSPITAL ENCOUNTER (EMERGENCY)
Age: 68
Discharge: HOME | End: 2023-04-28
Attending: EMERGENCY MEDICINE

## 2023-04-28 VITALS
DIASTOLIC BLOOD PRESSURE: 87 MMHG | BODY MASS INDEX: 22.44 KG/M2 | WEIGHT: 156.75 LBS | SYSTOLIC BLOOD PRESSURE: 169 MMHG | TEMPERATURE: 97.8 F | HEIGHT: 70 IN | OXYGEN SATURATION: 97 % | HEART RATE: 73 BPM | RESPIRATION RATE: 18 BRPM

## 2023-04-28 DIAGNOSIS — R07.9 CHEST PAIN, UNSPECIFIED TYPE: Primary | ICD-10-CM

## 2023-04-28 LAB
ALBUMIN SERPL-MCNC: 3.6 G/DL (ref 3.5–5)
ALBUMIN/GLOB SERPL: 0.9 (ref 1.1–2.2)
ALP SERPL-CCNC: 69 U/L (ref 45–117)
ALT SERPL-CCNC: 26 U/L (ref 12–78)
ANION GAP SERPL CALC-SCNC: 1 MMOL/L (ref 5–15)
AST SERPL-CCNC: 14 U/L (ref 15–37)
BASOPHILS # BLD: 0 K/UL (ref 0–0.1)
BASOPHILS NFR BLD: 1 % (ref 0–1)
BILIRUB SERPL-MCNC: 0.3 MG/DL (ref 0.2–1)
BUN SERPL-MCNC: 20 MG/DL (ref 6–20)
BUN/CREAT SERPL: 22 (ref 12–20)
CALCIUM SERPL-MCNC: 9.3 MG/DL (ref 8.5–10.1)
CHLORIDE SERPL-SCNC: 107 MMOL/L (ref 97–108)
CO2 SERPL-SCNC: 32 MMOL/L (ref 21–32)
COMMENT, HOLDF: NORMAL
CREAT SERPL-MCNC: 0.9 MG/DL (ref 0.7–1.3)
DIFFERENTIAL METHOD BLD: NORMAL
EOSINOPHIL # BLD: 0.1 K/UL (ref 0–0.4)
EOSINOPHIL NFR BLD: 2 % (ref 0–7)
ERYTHROCYTE [DISTWIDTH] IN BLOOD BY AUTOMATED COUNT: 12.4 % (ref 11.5–14.5)
GLOBULIN SER CALC-MCNC: 3.8 G/DL (ref 2–4)
GLUCOSE SERPL-MCNC: 124 MG/DL (ref 65–100)
HCT VFR BLD AUTO: 42.2 % (ref 36.6–50.3)
HGB BLD-MCNC: 13.7 G/DL (ref 12.1–17)
IMM GRANULOCYTES # BLD AUTO: 0 K/UL (ref 0–0.04)
IMM GRANULOCYTES NFR BLD AUTO: 0 % (ref 0–0.5)
LYMPHOCYTES # BLD: 1.9 K/UL (ref 0.8–3.5)
LYMPHOCYTES NFR BLD: 30 % (ref 12–49)
MCH RBC QN AUTO: 30.2 PG (ref 26–34)
MCHC RBC AUTO-ENTMCNC: 32.5 G/DL (ref 30–36.5)
MCV RBC AUTO: 93.2 FL (ref 80–99)
MONOCYTES # BLD: 0.3 K/UL (ref 0–1)
MONOCYTES NFR BLD: 5 % (ref 5–13)
NEUTS SEG # BLD: 3.9 K/UL (ref 1.8–8)
NEUTS SEG NFR BLD: 62 % (ref 32–75)
NRBC # BLD: 0 K/UL (ref 0–0.01)
NRBC BLD-RTO: 0 PER 100 WBC
PLATELET # BLD AUTO: 243 K/UL (ref 150–400)
PMV BLD AUTO: 10.6 FL (ref 8.9–12.9)
POTASSIUM SERPL-SCNC: 4 MMOL/L (ref 3.5–5.1)
PROT SERPL-MCNC: 7.4 G/DL (ref 6.4–8.2)
RBC # BLD AUTO: 4.53 M/UL (ref 4.1–5.7)
SAMPLES BEING HELD,HOLD: NORMAL
SODIUM SERPL-SCNC: 140 MMOL/L (ref 136–145)
TROPONIN I SERPL HS-MCNC: 10 NG/L (ref 0–57)
TROPONIN I SERPL HS-MCNC: 9 NG/L (ref 0–57)
WBC # BLD AUTO: 6.3 K/UL (ref 4.1–11.1)

## 2023-04-28 PROCEDURE — 71046 X-RAY EXAM CHEST 2 VIEWS: CPT

## 2023-04-28 PROCEDURE — 85025 COMPLETE CBC W/AUTO DIFF WBC: CPT

## 2023-04-28 PROCEDURE — 99285 EMERGENCY DEPT VISIT HI MDM: CPT

## 2023-04-28 PROCEDURE — 80053 COMPREHEN METABOLIC PANEL: CPT

## 2023-04-28 PROCEDURE — 93005 ELECTROCARDIOGRAM TRACING: CPT

## 2023-04-28 PROCEDURE — 36415 COLL VENOUS BLD VENIPUNCTURE: CPT

## 2023-04-28 PROCEDURE — 84484 ASSAY OF TROPONIN QUANT: CPT

## 2023-04-28 NOTE — ED TRIAGE NOTES
Patient arrives to ED complaining of intermittent chest pain for 1 month. Patient reports pain increases with activity.

## 2023-04-29 LAB
ATRIAL RATE: 62 BPM
CALCULATED P AXIS, ECG09: 75 DEGREES
CALCULATED R AXIS, ECG10: 36 DEGREES
CALCULATED T AXIS, ECG11: -48 DEGREES
DIAGNOSIS, 93000: NORMAL
P-R INTERVAL, ECG05: 208 MS
Q-T INTERVAL, ECG07: 408 MS
QRS DURATION, ECG06: 106 MS
QTC CALCULATION (BEZET), ECG08: 414 MS
VENTRICULAR RATE, ECG03: 62 BPM

## 2023-04-29 NOTE — ED PROVIDER NOTES
Dali García is a 80 yo M with h/o elevated cholesterol and diabetes who presents to the E with chest pain. He has had pain on and off for the past month. It sometimes increases with exertion. It feels like pressure. He went to [facility] and he was referred to the ED. Past Medical History:   Diagnosis Date    Diabetes ([address]     diet controlled    Hyperlipidemia     Sciatica        No past surgical history on file. No family history on file. Social History     Socioeconomic History    Marital status:      Spouse name: Not on file    Number of children: Not on file    Years of education: Not on file    Highest education level: Not on file   Occupational History    Not on file   Tobacco Use    Smoking status: Never    Smokeless tobacco: Never   Substance and Sexual Activity    Alcohol use: No    Drug use: No    Sexual activity: Not on file   Other Topics Concern    Not on file   Social History Narrative    Not on file     Social Determinants of Health     Financial Resource Strain: Not on file   Food Insecurity: Not on file   Transportation Needs: Not on file   Physical Activity: Not on file   Stress: Not on file   Social Connections: Not on file   Intimate Partner Violence: Not on file   Housing Stability: Not on file         ALLERGIES: Patient has no known allergies. Review of Systems   Constitutional:  Negative for fever. HENT:  Negative for sore throat. Eyes:  Negative for visual disturbance. Respiratory:  Negative for cough and shortness of breath. Cardiovascular:  Positive for chest pain. Gastrointestinal:  Negative for abdominal pain. Genitourinary:  Negative for dysuria. Musculoskeletal:  Negative for back pain. Skin:  Negative for rash. Neurological:  Negative for headaches.      Vitals:    04/28/23 1706   BP: (!) 169/87   Pulse: 73   Resp: 18   Temp: 97.8 °F (36.6 °C)   SpO2: 97%   Weight: 71.1 kg (156 lb 12 oz)   Height: 5' 10\" (1.778 m) Physical Exam  Vitals and nursing note reviewed. Constitutional:       General: He is not in acute distress. Appearance: He is well-developed. HENT:      Head: Normocephalic and atraumatic. Mouth/Throat:      Mouth: Mucous membranes are moist.   Eyes:      Extraocular Movements: Extraocular movements intact. Conjunctiva/sclera: Conjunctivae normal.   Neck:      Trachea: Phonation normal.   Cardiovascular:      Rate and Rhythm: Normal rate. Heart sounds: Normal heart sounds. No murmur heard. Pulmonary:      Effort: Pulmonary effort is normal. No respiratory distress. Breath sounds: No wheezing or rales. Abdominal:      General: There is no distension. Musculoskeletal:         General: No tenderness. Normal range of motion. Cervical back: Normal range of motion. Skin:     General: Skin is warm and dry. Neurological:      General: No focal deficit present. Mental Status: He is alert. He is not disoriented. Motor: No abnormal muscle tone. Medical Decision Making  Amount and/or Complexity of Data Reviewed  Labs: ordered. Radiology: ordered. ECG/medicine tests: ordered. ED Course as of 04/28/23 2034 Fri Apr 28, 2023   1749 EKG 1654  Independent evaluation shows normal sinus rhythm at 62 bpm with normal axis and intervals. T wave inversion in 2 3 and aVF. No STEMI [GG]      ED Course User Index  [GG] Ajay Clark DO     10:24 PM  Repeat Troponin normal.  Will discharge home. Referred to cardiology.     Procedures

## 2023-05-20 RX ORDER — ASPIRIN 81 MG/1
81 TABLET ORAL DAILY
COMMUNITY
Start: 2021-12-29

## 2023-05-20 RX ORDER — ATORVASTATIN CALCIUM 20 MG/1
20 TABLET, FILM COATED ORAL DAILY
COMMUNITY
Start: 2021-12-29

## 2023-07-25 NOTE — DISCHARGE SUMMARY
Discharge Summary     Patient:  Melani Meza       MRN: [id number]       YOB: 1955       Age: 77 y.o. Date of admission:  12/26/2021    Date of discharge:  12/28/2021    Primary care provider: Dr. Daisy Thornton MD    Admitting provider:  Mp Andujar MD    Discharging provider:  Brian Zapata MD - [phone]  If unavailable, call [phone] and ask the  to page the triage hospitalist.    Consultations  · IP CONSULT TO HOSPITALIST  · IP CONSULT TO NEUROLOGY    Procedures  · * No surgery found *    Discharge destination: HOME. The patient is stable for discharge. Admission diagnosis  · TIA (transient ischemic attack) [G45.9]    Current Discharge Medication List      START taking these medications    Details   aspirin delayed-release 81 mg tablet Take 1 Tablet by mouth daily. Qty: 30 Tablet, Refills: 11  Start date: 12/29/2021      atorvastatin (LIPITOR) 20 mg tablet Take 1 Tablet by mouth daily. Qty: 30 Tablet, Refills: 1  Start date: 12/29/2021         CONTINUE these medications which have NOT CHANGED    Details   metFORMIN (GLUCOPHAGE) 500 mg tablet Take 500 mg by mouth two (2) times daily (with meals). Follow-up Information     Follow up With Specialties Details Why Contact Info    Daisy Thornton MD Family Medicine  Discharge follow up  [facility],[address]  [phone]            Final discharge diagnoses and brief hospital course  Please also refer to the admission H&P for details on the presenting problem. 78 yo male with PMH xof DM, HLD admitted with right facial, right arm/leg numbness. Pt's symptoms lasted < 1 hr and resolved. TIA   - Will start ASA 81mg daily   - start Lipitor 20mg daily   - CTA h/n unremarkable  - MRI brain WNL   - ECHO WNL   - PT/OT: at baseline, no needs    DM type 2  - A1c 8.5 on admission,  but recheck was 6. 3? Recommend recheck in 3 months   - recommended following PCP to increase/adjust home metformin      Physical examination at discharge  Visit Vitals  /69 (BP 1 Location: Right upper arm, BP Patient Position: At rest)   Pulse 61   Temp 98.1 °F (36.7 °C)   Resp 17   Ht 5' 11\" (1.803 m)   Wt 64.9 kg (143 lb 1.3 oz)   SpO2 96%   BMI 19.96 kg/m²     Ao3  Lung clear  CVS: RRR  Abd: soft NT Nd   Ext: no edema  Neuro: Awake, clear speech, tongue midline, motor 5/5, sensory intact     Pertinent imaging studies:  MRI brain:  IMPRESSION  No acute intracranial hemorrhage or infarct. ECHO    Interpretation Summary    Result status: Final result       Left Ventricle: Left ventricle size is normal. Normal wall thickness. Normal wall motion. Normal left ventricular systolic function with a visually estimated EF of 60 - 65%. Normal diastolic function.   Poor quality parasternal images. LV measurements using M-Mode are inaccurate and off axis, and cannot be edited on the new forms - please disregard    Negative bubble study with no evidence of intracardiac shunt. Recent Labs     12/26/21  1643   WBC 6.7   HGB 14.4   HCT 43.1        Recent Labs     12/28/21  0152 12/26/21  1643    142   K 3.5 3.8    102   CO2 26 28   BUN 19 26*   CREA 0.73 0.90   GLU 93 123*   CA 8.7 9.1   MG 2.2  --    PHOS 3.4  --      Recent Labs     12/26/21  1643   AP 62   TP 7.3   ALB 4.0   GLOB 3.3     Recent Labs     12/26/21  1643   INR 1.1   PTP 11.0      No results for input(s): FE, TIBC, PSAT, FERR in the last 72 hours. No results for input(s): PH, PCO2, PO2 in the last 72 hours. No results for input(s): CPK, CKMB in the last 72 hours.     No lab exists for component: TROPONINI  No components found for: [place]    Chronic Diagnoses:    Problem List as of 12/28/2021 Date Reviewed: 4/2/2014          Codes Class Noted - Resolved    TIA (transient ischemic attack) ICD-10-CM: G45.9  ICD-9-CM: 435.9  12/26/2021 - Present Time spent on discharge related activities today greater than 30 minutes.       Signed:  Dorinda Shrestha MD                 Hospitalist, Internal Medicine      Cc: Kelli Thapa MD [place] counseling:  Patient advised to apply a pea-sized amount only at bedtime and wait 30 minutes after washing their face before applying.  If too drying, patient may add a non-comedogenic moisturizer.  The most commonly reported side effects including irritation, redness, scaling, dryness, stinging, burning, itching, and increased risk of sunburn.  The patient verbalized understanding of the proper use and possible adverse effects of retinoids.  All of the patient's questions and concerns were addressed.